# Patient Record
Sex: FEMALE | Race: WHITE | NOT HISPANIC OR LATINO | Employment: FULL TIME | URBAN - METROPOLITAN AREA
[De-identification: names, ages, dates, MRNs, and addresses within clinical notes are randomized per-mention and may not be internally consistent; named-entity substitution may affect disease eponyms.]

---

## 2020-09-13 ENCOUNTER — OFFICE VISIT (OUTPATIENT)
Dept: URGENT CARE | Facility: CLINIC | Age: 25
End: 2020-09-13
Payer: COMMERCIAL

## 2020-09-13 VITALS
DIASTOLIC BLOOD PRESSURE: 72 MMHG | HEART RATE: 65 BPM | TEMPERATURE: 98.1 F | OXYGEN SATURATION: 99 % | SYSTOLIC BLOOD PRESSURE: 118 MMHG | RESPIRATION RATE: 16 BRPM

## 2020-09-13 DIAGNOSIS — M32.9 LUPUS (HCC): ICD-10-CM

## 2020-09-13 PROCEDURE — 99203 OFFICE O/P NEW LOW 30 MIN: CPT | Performed by: PHYSICIAN ASSISTANT

## 2020-09-13 RX ORDER — PREDNISONE 20 MG/1
TABLET ORAL
Qty: 12 TAB | Refills: 0 | Status: SHIPPED | OUTPATIENT
Start: 2020-09-13 | End: 2020-09-14

## 2020-09-13 RX ORDER — HYDROXYCHLOROQUINE SULFATE 200 MG/1
400 TABLET, FILM COATED ORAL DAILY
Qty: 30 TAB | COMMUNITY
Start: 2020-09-13

## 2020-09-13 RX ORDER — POTASSIUM CHLORIDE 1.5 G/1.58G
20 POWDER, FOR SOLUTION ORAL 2 TIMES DAILY
Qty: 30 PACKET | Refills: 3 | COMMUNITY
Start: 2020-09-13

## 2020-09-13 ASSESSMENT — ENCOUNTER SYMPTOMS
VOMITING: 0
FATIGUE: 1
NUMBNESS: 0
NAUSEA: 0
FEVER: 0
WEAKNESS: 0
HEADACHES: 0
MYALGIAS: 0
ABDOMINAL PAIN: 1
CHANGE IN BOWEL HABIT: 0
JOINT SWELLING: 1
COUGH: 0
SORE THROAT: 0

## 2020-09-13 NOTE — PROGRESS NOTES
Subjective:      Lana Steve is a 25 y.o. female who presents with Joint Swelling (Lupus flare x 2 days)            Lupus flare.  Causing bilateral joint pain in the hands, malar rash, fatigue and generalized abdominal pain.  Has had lupus since she was 5.  On chronic medication.  Requesting steroids.    Joint Swelling  This is a recurrent problem. The current episode started in the past 7 days. The problem occurs constantly. The problem has been gradually worsening. Associated symptoms include abdominal pain, fatigue, joint swelling and a rash. Pertinent negatives include no change in bowel habit, congestion, coughing, fever, headaches, myalgias, nausea, numbness, sore throat, vomiting or weakness. She has tried NSAIDs for the symptoms. The treatment provided mild relief.       PMH:  has no past medical history on file.  MEDS:   Current Outpatient Medications:   •  hydroxychloroquine (PLAQUENIL) 200 MG Tab, Take 2 Tabs by mouth every day., Disp: 30 Tab, Rfl:   •  potassium chloride (KLOR-CON) 20 MEQ Pack, Take 1 Packet by mouth 2 times a day., Disp: 30 Packet, Rfl: 3  •  predniSONE (DELTASONE) 20 MG Tab, Take 2 tabs PO QD x 3 days. Take 1 tab PO QD x 3 days. Take 1/2 tab PO QD x 3 days., Disp: 12 Tab, Rfl: 0  ALLERGIES:   Allergies   Allergen Reactions   • Sulfa Drugs      SURGHX: No past surgical history on file.  SOCHX:    FH: family history is not on file.    Review of Systems   Constitutional: Positive for fatigue. Negative for fever.   HENT: Negative for congestion and sore throat.    Respiratory: Negative for cough.    Gastrointestinal: Positive for abdominal pain. Negative for change in bowel habit, nausea and vomiting.   Musculoskeletal: Positive for joint swelling. Negative for myalgias.   Skin: Positive for rash.   Neurological: Negative for weakness, numbness and headaches.       Medications, Allergies, and current problem list reviewed today in Epic     Objective:     /72   Pulse 65   Temp  36.7 °C (98.1 °F)   Resp 16   SpO2 99%      Physical Exam  Vitals signs and nursing note reviewed.   Constitutional:       General: She is not in acute distress.     Appearance: She is well-developed. She is not diaphoretic.   HENT:      Head: Normocephalic and atraumatic.      Nose: No congestion or rhinorrhea.      Mouth/Throat:      Pharynx: No oropharyngeal exudate or posterior oropharyngeal erythema.   Eyes:      Conjunctiva/sclera: Conjunctivae normal.   Neck:      Musculoskeletal: Normal range of motion and neck supple.   Cardiovascular:      Rate and Rhythm: Normal rate and regular rhythm.      Heart sounds: Normal heart sounds.   Pulmonary:      Effort: Pulmonary effort is normal. No respiratory distress.      Breath sounds: Normal breath sounds. No wheezing, rhonchi or rales.   Abdominal:      General: Abdomen is flat. There is no distension.      Tenderness: There is no abdominal tenderness (Generalized). There is no right CVA tenderness, left CVA tenderness, guarding or rebound.   Musculoskeletal:      Comments: Joint pain of the hands without deformity, erythema or swelling.   Skin:     General: Skin is warm and dry.      Findings: Rash present.      Comments: Erythematous lacy rash on the chest.   Neurological:      Mental Status: She is alert and oriented to person, place, and time.   Psychiatric:         Behavior: Behavior normal.         Thought Content: Thought content normal.         Judgment: Judgment normal.                 Assessment/Plan:         1. Lupus (HCC)  predniSONE (DELTASONE) 20 MG Tab     Acute lupus flare.  Patient otherwise asymptomatic.  Vital signs kika.  Prednisone burst with a taper provided.  She is requesting pain meds including oxycodone.  Advised patient I do not prescribe this medication in the urgent care.  She is visiting from out of town and will follow-up with her rheumatologist when she returns home.  OTC meds and conservative measures as discussed    Return to  clinic or go to ED if symptoms worsen or persist. Indications for ED discussed at length. Patient voices understanding. Follow-up with your primary care provider in 3-5 days. Red flags discussed. All side effects of medication discussed including allergic response, GI upset, tendon injury, etc.    Please note that this dictation was created using voice recognition software. I have made every reasonable attempt to correct obvious errors, but I expect that there are errors of grammar and possibly content that I did not discover before finalizing the note.

## 2020-09-14 ENCOUNTER — HOSPITAL ENCOUNTER (EMERGENCY)
Facility: MEDICAL CENTER | Age: 25
End: 2020-09-14
Attending: EMERGENCY MEDICINE
Payer: COMMERCIAL

## 2020-09-14 VITALS
RESPIRATION RATE: 18 BRPM | HEART RATE: 95 BPM | DIASTOLIC BLOOD PRESSURE: 80 MMHG | SYSTOLIC BLOOD PRESSURE: 123 MMHG | WEIGHT: 132.94 LBS | TEMPERATURE: 98.2 F | OXYGEN SATURATION: 94 %

## 2020-09-14 DIAGNOSIS — M79.7 FIBROMYALGIA: ICD-10-CM

## 2020-09-14 DIAGNOSIS — M25.50 ARTHRALGIA, UNSPECIFIED JOINT: ICD-10-CM

## 2020-09-14 DIAGNOSIS — M32.9 LUPUS ARTHRITIS (HCC): ICD-10-CM

## 2020-09-14 PROCEDURE — 700102 HCHG RX REV CODE 250 W/ 637 OVERRIDE(OP): Performed by: EMERGENCY MEDICINE

## 2020-09-14 PROCEDURE — 96372 THER/PROPH/DIAG INJ SC/IM: CPT

## 2020-09-14 PROCEDURE — 99283 EMERGENCY DEPT VISIT LOW MDM: CPT

## 2020-09-14 PROCEDURE — A9270 NON-COVERED ITEM OR SERVICE: HCPCS | Performed by: EMERGENCY MEDICINE

## 2020-09-14 PROCEDURE — 700111 HCHG RX REV CODE 636 W/ 250 OVERRIDE (IP): Performed by: EMERGENCY MEDICINE

## 2020-09-14 RX ORDER — DEXAMETHASONE SODIUM PHOSPHATE 4 MG/ML
4 INJECTION, SOLUTION INTRA-ARTICULAR; INTRALESIONAL; INTRAMUSCULAR; INTRAVENOUS; SOFT TISSUE ONCE
Status: COMPLETED | OUTPATIENT
Start: 2020-09-14 | End: 2020-09-14

## 2020-09-14 RX ORDER — OXYCODONE HCL 10 MG/1
10 TABLET, FILM COATED, EXTENDED RELEASE ORAL ONCE
Status: COMPLETED | OUTPATIENT
Start: 2020-09-14 | End: 2020-09-14

## 2020-09-14 RX ADMIN — OXYCODONE HYDROCHLORIDE 10 MG: 10 TABLET, FILM COATED, EXTENDED RELEASE ORAL at 13:53

## 2020-09-14 RX ADMIN — DEXAMETHASONE SODIUM PHOSPHATE 4 MG: 4 INJECTION, SOLUTION INTRA-ARTICULAR; INTRALESIONAL; INTRAMUSCULAR; INTRAVENOUS; SOFT TISSUE at 13:53

## 2020-09-14 SDOH — HEALTH STABILITY: MENTAL HEALTH: HOW OFTEN DO YOU HAVE A DRINK CONTAINING ALCOHOL?: NEVER

## 2020-09-14 ASSESSMENT — PAIN DESCRIPTION - PAIN TYPE
TYPE: CHRONIC PAIN
TYPE: PHANTOM PAIN

## 2020-09-14 ASSESSMENT — LIFESTYLE VARIABLES: DO YOU DRINK ALCOHOL: NO

## 2020-09-14 NOTE — ED NOTES
Patient medicated per order, see MAR.  Patient resting on gurney, respirations even and unlabored.

## 2020-09-14 NOTE — ED TRIAGE NOTES
"Chief Complaint   Patient presents with   • Lupus     x2days   • Joint Pain   • Fatigue     Pt ambulated to triage, history of Lupus. Pt feels like she is having \"flare-up\".   No respiratory symptoms.   "

## 2020-09-14 NOTE — ED PROVIDER NOTES
ED Provider Note    CHIEF COMPLAINT  Chief Complaint   Patient presents with   • Lupus     x2days   • Joint Pain   • Fatigue       HPI  Lana Steve is a 25 y.o. female who presents with widespread pain consistent with her usual lupus flares, she also states it could be secondary to her fibromyalgia.  This been going on for couple days, she is just visiting here to attend the  of her uncle and cousin.  Since arriving she states the pain has flared up.  She called to establish an appointment with her rheumatologist in 3 days as soon as she returns home but in the meantime states that she usually gets a shot of Decadron and a couple days of Percocet for this sort of a circumstance.  She has not had a fever.  She has no chest pain or shortness of breath, no other complaints at this time.  States that the hands and wrists as well as the ankles and feet seem to be the worst and this is consistent with her regular flares.    REVIEW OF SYSTEMS  Negative for fever, chest pain, dyspnea, focal weakness, focal numbness, focal tingling.    PAST MEDICAL HISTORY   has a past medical history of Fibromyalgia, Lupus (HCC), Pituitary tumor, and POTS (postural orthostatic tachycardia syndrome).    SOCIAL HISTORY  Social History     Tobacco Use   • Smoking status: Never Smoker   • Smokeless tobacco: Never Used   Substance and Sexual Activity   • Alcohol use: Never     Frequency: Never   • Drug use: Not on file     Comment: thc vitamins   • Sexual activity: Not on file       SURGICAL HISTORY  patient denies any surgical history    CURRENT MEDICATIONS  I personally reviewed the medication list in the charting documentation.     ALLERGIES  Allergies   Allergen Reactions   • Sulfa Drugs        PHYSICAL EXAM  VITAL SIGNS: /78   Pulse (!) 110   Temp 36.8 °C (98.2 °F) (Temporal)   Resp 18   Wt 60.3 kg (132 lb 15 oz)   SpO2 99%   Constitutional: Alert in no apparent distress.  HENT: No signs of trauma.   Eyes: Conjunctiva  normal, Non-icteric.   Chest: Normal nonlabored respirations  Skin: No erythema, No rash.   Musculoskeletal: Good range of motion in all major joints.   Neurologic: Alert, No focal deficits noted.   Psychiatric: Affect normal, Judgment normal.    COURSE & MEDICAL DECISION MAKING  Pertinent Labs & Imaging studies reviewed. (See chart for details)    Encounter Summary: This is a 25 y.o. female with chronic pain flareup related to either her lupus or fibromyalgia, visiting here from [place], states that in these circumstances she is typically treated with a shot of Decadron and some Percocet.  No other acute complaints and appears well on exam.  During the emergency department she will be treated with a single dose of sustained-release oxycodone, 10 mg but in being consistent with hospital policy and local prescribing practices, I will not prescribe any opiate-based pain medication for her chronic pain.  She will receive a dose of IM Decadron here in the emergency department as she states that typically helps as well.  She already established follow-up with her rheumatologist back home in [place] on Thursday, she is being discharged to attend that follow-up appointment.      DISPOSITION: Discharge Home      FINAL IMPRESSION  1. Lupus arthritis (HCC)    2. Fibromyalgia    3. Arthralgia, unspecified joint        This dictation was created using voice recognition software. The accuracy of the dictation is limited to the abilities of the software. I expect there may be some errors of grammar and possibly content. The nursing notes were reviewed and certain aspects of this information were incorporated into this note.    Electronically signed by: Chris Cameron M.D., 9/14/2020 1:38 PM

## 2020-09-14 NOTE — ED NOTES
Lana Power discharged via ambulation with Grandmother driving home.  Discharge instructions given and reviewed, patient educated to follow up with ED if worsening, verbalized understanding.  All personal belongings in possession.  No questions at this time.

## 2020-09-14 NOTE — ED NOTES
Pt ambulatory to [facility].  Agree with triage assessment; patient reports she is followed by multiple specialists in home [facility].  Pt changed into gown.  Chart up for ERP.